# Patient Record
Sex: MALE | Race: BLACK OR AFRICAN AMERICAN | Employment: UNEMPLOYED | URBAN - METROPOLITAN AREA
[De-identification: names, ages, dates, MRNs, and addresses within clinical notes are randomized per-mention and may not be internally consistent; named-entity substitution may affect disease eponyms.]

---

## 2017-01-01 ENCOUNTER — TELEPHONE (OUTPATIENT)
Dept: FAMILY MEDICINE CLINIC | Age: 47
End: 2017-01-01

## 2017-01-13 NOTE — TELEPHONE ENCOUNTER
Received call from officer Janeth Babin, [facility] about patient. As per officer, pt  at home today. As per description given to me, pt was feeling dizzy and wife went out to buy orange juice and other grocery stuff ( as pt has h/o diabetes). When she returned, she found him gasping and unconscious. She called 911 and EMS. As per officer, EMS tried for 30 minutes at home and declared him dead at 17:17 PM.   As per officer, there were no susipicios activity or findings at site. On my questioning, why he was not taken to ER, he informed me that when EMS arrived, pt was already dead and they tried everything at scene for 30 minutes. Officer requesting if our practice is comfortable doing death certificate for patient, as required by  home. I called Dr Lester Maya, PCP of patient to verify if that is ok from his side. Dr Lester Maya did agree to sign on birth certificate. Informed , that our practice will sign on death certificate.